# Patient Record
Sex: FEMALE | Race: WHITE
[De-identification: names, ages, dates, MRNs, and addresses within clinical notes are randomized per-mention and may not be internally consistent; named-entity substitution may affect disease eponyms.]

---

## 2022-10-04 LAB
ALBUMIN SERPL BCP-MCNC: 4.2 G/DL (ref 3.4–5)
ALBUMIN/GLOB SERPL: 1.2 {RATIO} (ref 1.1–1.5)
ALP SERPL-CCNC: 91 IU/L (ref 46–116)
ALT SERPL W P-5'-P-CCNC: 23 U/L (ref 12–78)
ANION GAP SERPL CALCULATED.3IONS-SCNC: 6 MMOL/L (ref 8–16)
AST SERPL W P-5'-P-CCNC: 30 U/L (ref 10–37)
BASOPHILS # BLD AUTO: 0.1 X10'3 (ref 0–0.2)
BASOPHILS NFR BLD AUTO: 0.9 % (ref 0–1)
BILIRUB SERPL-MCNC: 0.8 MG/DL (ref 0.1–1)
BUN SERPL-MCNC: 22 MG/DL (ref 7–18)
BUN/CREAT SERPL: 13.8 (ref 6.6–38)
CALCIUM SERPL-MCNC: 10 MG/DL (ref 8.5–10.1)
CHLORIDE SERPL-SCNC: 97 MMOL/L (ref 99–107)
CO2 SERPL-SCNC: 37.6 MMOL/L (ref 24–32)
CREAT SERPL-MCNC: 1.59 MG/DL (ref 0.4–0.9)
EOSINOPHIL # BLD AUTO: 0.4 X10'3 (ref 0–0.9)
EOSINOPHIL NFR BLD AUTO: 4.2 % (ref 0–6)
ERYTHROCYTE [DISTWIDTH] IN BLOOD BY AUTOMATED COUNT: 14.1 % (ref 11.5–14.5)
GFR SERPL CREATININE-BSD FRML MDRD: 32 ML/MIN
GLUCOSE SERPL-MCNC: 124 MG/DL (ref 70–104)
HCT VFR BLD AUTO: 46.9 % (ref 35–45)
HGB BLD-MCNC: 16.4 G/DL (ref 12–16)
LYMPHOCYTES # BLD AUTO: 2.4 X10'3 (ref 1.1–4.8)
LYMPHOCYTES NFR BLD AUTO: 23.4 % (ref 21–51)
MCH RBC QN AUTO: 33.8 PG (ref 27–31)
MCHC RBC AUTO-ENTMCNC: 34.9 G/DL (ref 33–36.5)
MCV RBC AUTO: 97 FL (ref 78–98)
MONOCYTES # BLD AUTO: 0.6 X10'3 (ref 0–0.9)
MONOCYTES NFR BLD AUTO: 5.5 % (ref 2–12)
NEUTROPHILS # BLD AUTO: 6.8 X10'3 (ref 1.8–7.7)
NEUTROPHILS NFR BLD AUTO: 66 % (ref 42–75)
PLATELET # BLD AUTO: 266 X10'3 (ref 140–440)
PMV BLD AUTO: 8.8 FL (ref 7.4–10.4)
POTASSIUM SERPL-SCNC: 2.9 MMOL/L (ref 3.5–5.1)
PROT SERPL-MCNC: 7.8 G/DL (ref 6.4–8.2)
RBC # BLD AUTO: 4.83 X10'6 (ref 4.2–5.6)
SODIUM SERPL-SCNC: 141 MMOL/L (ref 135–145)
WBC # BLD AUTO: 10.3 X10'3 (ref 4.5–11)

## 2022-10-10 ENCOUNTER — HOSPITAL ENCOUNTER (OUTPATIENT)
Dept: HOSPITAL 94 - PAS | Age: 77
Discharge: HOME | End: 2022-10-10
Attending: ORTHOPAEDIC SURGERY
Payer: MEDICARE

## 2022-10-10 VITALS — DIASTOLIC BLOOD PRESSURE: 72 MMHG | SYSTOLIC BLOOD PRESSURE: 151 MMHG

## 2022-10-10 VITALS — DIASTOLIC BLOOD PRESSURE: 66 MMHG | SYSTOLIC BLOOD PRESSURE: 146 MMHG

## 2022-10-10 VITALS — DIASTOLIC BLOOD PRESSURE: 59 MMHG | SYSTOLIC BLOOD PRESSURE: 138 MMHG

## 2022-10-10 VITALS — DIASTOLIC BLOOD PRESSURE: 64 MMHG | SYSTOLIC BLOOD PRESSURE: 135 MMHG

## 2022-10-10 VITALS — WEIGHT: 148.15 LBS | BODY MASS INDEX: 29.09 KG/M2 | HEIGHT: 60 IN

## 2022-10-10 VITALS — SYSTOLIC BLOOD PRESSURE: 146 MMHG | DIASTOLIC BLOOD PRESSURE: 72 MMHG

## 2022-10-10 VITALS — SYSTOLIC BLOOD PRESSURE: 126 MMHG | DIASTOLIC BLOOD PRESSURE: 57 MMHG

## 2022-10-10 VITALS — SYSTOLIC BLOOD PRESSURE: 131 MMHG | DIASTOLIC BLOOD PRESSURE: 57 MMHG

## 2022-10-10 VITALS — DIASTOLIC BLOOD PRESSURE: 61 MMHG | SYSTOLIC BLOOD PRESSURE: 131 MMHG

## 2022-10-10 VITALS — SYSTOLIC BLOOD PRESSURE: 153 MMHG | DIASTOLIC BLOOD PRESSURE: 70 MMHG

## 2022-10-10 VITALS — SYSTOLIC BLOOD PRESSURE: 144 MMHG | DIASTOLIC BLOOD PRESSURE: 74 MMHG

## 2022-10-10 VITALS — DIASTOLIC BLOOD PRESSURE: 61 MMHG | SYSTOLIC BLOOD PRESSURE: 141 MMHG

## 2022-10-10 VITALS — SYSTOLIC BLOOD PRESSURE: 146 MMHG | DIASTOLIC BLOOD PRESSURE: 66 MMHG

## 2022-10-10 VITALS — SYSTOLIC BLOOD PRESSURE: 147 MMHG | DIASTOLIC BLOOD PRESSURE: 71 MMHG

## 2022-10-10 DIAGNOSIS — F41.9: ICD-10-CM

## 2022-10-10 DIAGNOSIS — G56.02: Primary | ICD-10-CM

## 2022-10-10 DIAGNOSIS — Z79.899: ICD-10-CM

## 2022-10-10 DIAGNOSIS — F17.210: ICD-10-CM

## 2022-10-10 DIAGNOSIS — Z98.890: ICD-10-CM

## 2022-10-10 LAB
ANION GAP BLD CALC-SCNC: 13 MMOL/L (ref 8–12)
BUN BLD-MCNC: 23 MG/DL (ref 7–18)
BUN/CREAT BLD: 16.4 (ref 6.6–38)
CA-I BLD-SCNC: 1.27 MMOL/L (ref 1.03–1.32)
CHLORIDE BLD-SCNC: 99 MMOL/L (ref 99–107)
CO2 BLD-SCNC: 30 MMOL/L (ref 24–32)
CREAT BLD-MCNC: 1.4 MG/DL (ref 0.6–1.1)
GFR SERPL CREATININE-BSD FRML MDRD: 37 ML/MIN
GLUCOSE SERPLBLD-MCNC: 122 MG/DL (ref 70–104)
HCT VFR BLD CALC: 43 %PCV (ref 35–45)
HGB BLD CALC-MCNC: 14.6 G/DL (ref 12–16)
POTASSIUM BLD-SCNC: 3.5 MMOL/L (ref 3.5–5.1)
SODIUM BLD-SCNC: 142 MMOL/L (ref 135–145)

## 2022-10-10 PROCEDURE — 80053 COMPREHEN METABOLIC PANEL: CPT

## 2022-10-10 PROCEDURE — 85025 COMPLETE CBC W/AUTO DIFF WBC: CPT

## 2022-10-10 PROCEDURE — 80047 BASIC METABLC PNL IONIZED CA: CPT

## 2022-10-10 PROCEDURE — 93005 ELECTROCARDIOGRAM TRACING: CPT

## 2022-10-10 PROCEDURE — 64721 CARPAL TUNNEL SURGERY: CPT

## 2022-10-10 PROCEDURE — 36415 COLL VENOUS BLD VENIPUNCTURE: CPT

## 2022-10-10 NOTE — NUR
PT UP AND ABLE TO AMBULATE SAFELY, VOID X 1. D/C INSTRUCTIONS GIVEN AND GONE OVER W/PT WHO 
VERBALIZED UNDERSTANDING. PT D/CD TO HOME VIA W/C TO PRIVATE VEHICLE W/O INCIDENT.

-------------------------------------------------------------------------------

Addendum: 10/10/22 at 1214 by Crystal Lopez RN

-------------------------------------------------------------------------------

Amended: Links added.

## 2022-10-10 NOTE — NUR
Received from OR via BED, accompanied by Anesthesiologist DR PORTER and report given by 
Anesthesiologist AND OR RN. PT VERY DROWSY, NO S/S OF DISTRESS/DISCOMFORT. LEFT HAND W/ACE 
WRAP COVERING CDI. FINGERS PWD, CRT 1-2 SECONDS.

-------------------------------------------------------------------------------

Addendum: 10/10/22 at 1027 by Crystal Lopez RN

-------------------------------------------------------------------------------

Amended: Links added.